# Patient Record
Sex: FEMALE | Race: WHITE
[De-identification: names, ages, dates, MRNs, and addresses within clinical notes are randomized per-mention and may not be internally consistent; named-entity substitution may affect disease eponyms.]

---

## 2023-03-28 ENCOUNTER — HOSPITAL ENCOUNTER (INPATIENT)
Dept: HOSPITAL 26 - MED | Age: 68
LOS: 3 days | Discharge: HOME HEALTH | DRG: 871 | End: 2023-03-31
Attending: STUDENT IN AN ORGANIZED HEALTH CARE EDUCATION/TRAINING PROGRAM | Admitting: STUDENT IN AN ORGANIZED HEALTH CARE EDUCATION/TRAINING PROGRAM
Payer: COMMERCIAL

## 2023-03-28 VITALS
DIASTOLIC BLOOD PRESSURE: 47 MMHG | SYSTOLIC BLOOD PRESSURE: 110 MMHG | WEIGHT: 212.19 LBS | BODY MASS INDEX: 40.06 KG/M2 | HEIGHT: 61 IN

## 2023-03-28 DIAGNOSIS — E11.65: ICD-10-CM

## 2023-03-28 DIAGNOSIS — D63.8: ICD-10-CM

## 2023-03-28 DIAGNOSIS — L30.4: ICD-10-CM

## 2023-03-28 DIAGNOSIS — I50.9: ICD-10-CM

## 2023-03-28 DIAGNOSIS — J18.9: ICD-10-CM

## 2023-03-28 DIAGNOSIS — N17.0: ICD-10-CM

## 2023-03-28 DIAGNOSIS — J44.0: ICD-10-CM

## 2023-03-28 DIAGNOSIS — J96.21: ICD-10-CM

## 2023-03-28 DIAGNOSIS — Z90.49: ICD-10-CM

## 2023-03-28 DIAGNOSIS — Z87.891: ICD-10-CM

## 2023-03-28 DIAGNOSIS — J44.1: ICD-10-CM

## 2023-03-28 DIAGNOSIS — A41.9: Primary | ICD-10-CM

## 2023-03-28 DIAGNOSIS — D72.0: ICD-10-CM

## 2023-03-28 DIAGNOSIS — I11.0: ICD-10-CM

## 2023-03-28 DIAGNOSIS — Z20.822: ICD-10-CM

## 2023-03-28 DIAGNOSIS — E78.5: ICD-10-CM

## 2023-03-28 LAB
ALBUMIN FLD-MCNC: 2.6 G/DL (ref 3.4–5)
ANION GAP SERPL CALCULATED.3IONS-SCNC: 15.3 MMOL/L (ref 8–16)
AST SERPL-CCNC: 21 U/L (ref 15–37)
BASOPHILS # BLD AUTO: 0.1 K/UL (ref 0–0.22)
BASOPHILS NFR BLD AUTO: 0.6 % (ref 0–2)
BILIRUB SERPL-MCNC: 0.3 MG/DL (ref 0–1)
BUN SERPL-MCNC: 40 MG/DL (ref 7–18)
CHLORIDE SERPL-SCNC: 93 MMOL/L (ref 98–107)
CO2 SERPL-SCNC: 28.5 MMOL/L (ref 21–32)
CREAT SERPL-MCNC: 1.7 MG/DL (ref 0.6–1.3)
EOSINOPHIL # BLD AUTO: 0 K/UL (ref 0–0.4)
EOSINOPHIL NFR BLD AUTO: 0 % (ref 0–4)
ERYTHROCYTE [DISTWIDTH] IN BLOOD BY AUTOMATED COUNT: 16.6 % (ref 11.6–13.7)
GFR SERPL CREATININE-BSD FRML MDRD: 39 ML/MIN (ref 90–?)
GLUCOSE SERPL-MCNC: 407 MG/DL (ref 74–106)
HCT VFR BLD AUTO: 34.5 % (ref 36–48)
HGB BLD-MCNC: 11 G/DL (ref 12–16)
LYMPHOCYTES # BLD AUTO: 0.4 K/UL (ref 2.5–16.5)
LYMPHOCYTES NFR BLD AUTO: 2.6 % (ref 20.5–51.1)
MCH RBC QN AUTO: 27 PG (ref 27–31)
MCHC RBC AUTO-ENTMCNC: 32 G/DL (ref 33–37)
MCV RBC AUTO: 84.1 FL (ref 80–94)
MONOCYTES # BLD AUTO: 0.2 K/UL (ref 0.8–1)
MONOCYTES NFR BLD AUTO: 1.1 % (ref 1.7–9.3)
NEUTROPHILS # BLD AUTO: 13.6 K/UL (ref 1.8–7.7)
NEUTROPHILS NFR BLD AUTO: 95.7 % (ref 42.2–75.2)
PLATELET # BLD AUTO: 324 K/UL (ref 140–450)
POTASSIUM SERPL-SCNC: 4.8 MMOL/L (ref 3.5–5.1)
RBC # BLD AUTO: 4.1 MIL/UL (ref 4.2–5.4)
SODIUM SERPL-SCNC: 132 MMOL/L (ref 136–145)
WBC # BLD AUTO: 14.3 K/UL (ref 4.8–10.8)

## 2023-03-28 PROCEDURE — C9113 INJ PANTOPRAZOLE SODIUM, VIA: HCPCS

## 2023-03-28 NOTE — NUR
brought in from [facility] with c/o SOB, according to pt, [facility] lost power  for a bit 
and she is normally on 3L of O2 NC. pmhx: HTN, DM, COPD, and CHF. denies any 
allergies.

## 2023-03-29 VITALS — SYSTOLIC BLOOD PRESSURE: 155 MMHG | DIASTOLIC BLOOD PRESSURE: 72 MMHG

## 2023-03-29 VITALS — DIASTOLIC BLOOD PRESSURE: 72 MMHG | SYSTOLIC BLOOD PRESSURE: 133 MMHG

## 2023-03-29 VITALS — DIASTOLIC BLOOD PRESSURE: 85 MMHG | SYSTOLIC BLOOD PRESSURE: 138 MMHG

## 2023-03-29 VITALS — DIASTOLIC BLOOD PRESSURE: 85 MMHG | SYSTOLIC BLOOD PRESSURE: 153 MMHG

## 2023-03-29 VITALS — DIASTOLIC BLOOD PRESSURE: 79 MMHG | SYSTOLIC BLOOD PRESSURE: 135 MMHG

## 2023-03-29 VITALS — SYSTOLIC BLOOD PRESSURE: 152 MMHG | DIASTOLIC BLOOD PRESSURE: 86 MMHG

## 2023-03-29 LAB
AMYLASE SERPL-CCNC: 29 U/L (ref 25–115)
ANION GAP SERPL CALCULATED.3IONS-SCNC: 10.4 MMOL/L (ref 8–16)
APPEARANCE UR: CLEAR
BARBITURATES UR QL SCN: NEGATIVE NG/ML
BASOPHILS # BLD AUTO: 0.1 K/UL (ref 0–0.22)
BASOPHILS NFR BLD AUTO: 0.9 % (ref 0–2)
BENZODIAZ UR QL SCN: NEGATIVE NG/ML
BILIRUB UR QL STRIP: NEGATIVE
BUN SERPL-MCNC: 28 MG/DL (ref 7–18)
BZE UR QL SCN: NEGATIVE NG/ML
CANNABINOIDS UR QL SCN: NEGATIVE NG/ML
CHLORIDE SERPL-SCNC: 94 MMOL/L (ref 98–107)
CHOLEST/HDLC SERPL: 2.7 {RATIO} (ref 1–4.5)
CO2 SERPL-SCNC: 35.3 MMOL/L (ref 21–32)
COLOR UR: YELLOW
CREAT SERPL-MCNC: 1.3 MG/DL (ref 0.6–1.3)
EOSINOPHIL # BLD AUTO: 0 K/UL (ref 0–0.4)
EOSINOPHIL NFR BLD AUTO: 0 % (ref 0–4)
ERYTHROCYTE [DISTWIDTH] IN BLOOD BY AUTOMATED COUNT: 16.3 % (ref 11.6–13.7)
GFR SERPL CREATININE-BSD FRML MDRD: 53 ML/MIN (ref 90–?)
GLUCOSE SERPL-MCNC: 314 MG/DL (ref 74–106)
GLUCOSE UR STRIP-MCNC: (no result) MG/DL
HCT VFR BLD AUTO: 33.3 % (ref 36–48)
HDLC SERPL-MCNC: 62 MG/DL (ref 40–60)
HGB BLD-MCNC: 10.5 G/DL (ref 12–16)
HGB UR QL STRIP: NEGATIVE
LDLC SERPL CALC-MCNC: 84 MG/DL (ref 60–100)
LEUKOCYTE ESTERASE UR QL STRIP: NEGATIVE
LIPASE SERPL-CCNC: 77 U/L (ref 73–393)
LYMPHOCYTES # BLD AUTO: 0.7 K/UL (ref 2.5–16.5)
LYMPHOCYTES NFR BLD AUTO: 5.6 % (ref 20.5–51.1)
MAGNESIUM SERPL-MCNC: 2.1 MG/DL (ref 1.8–2.4)
MCH RBC QN AUTO: 27 PG (ref 27–31)
MCHC RBC AUTO-ENTMCNC: 32 G/DL (ref 33–37)
MCV RBC AUTO: 84.4 FL (ref 80–94)
MONOCYTES # BLD AUTO: 0.6 K/UL (ref 0.8–1)
MONOCYTES NFR BLD AUTO: 4.9 % (ref 1.7–9.3)
NEUTROPHILS # BLD AUTO: 10.7 K/UL (ref 1.8–7.7)
NEUTROPHILS NFR BLD AUTO: 88.6 % (ref 42.2–75.2)
NITRITE UR QL STRIP: NEGATIVE
OPIATES UR QL SCN: NEGATIVE NG/ML
PCP UR QL SCN: NEGATIVE NG/ML
PH UR STRIP: 5.5 [PH] (ref 5–9)
PHOSPHATE SERPL-MCNC: 5 MG/DL (ref 2.5–4.9)
PLATELET # BLD AUTO: 341 K/UL (ref 140–450)
POTASSIUM SERPL-SCNC: 3.7 MMOL/L (ref 3.5–5.1)
PROTHROMBIN TIME: 9.5 SECS (ref 10.8–13.4)
RBC # BLD AUTO: 3.95 MIL/UL (ref 4.2–5.4)
SODIUM SERPL-SCNC: 136 MMOL/L (ref 136–145)
T4 FREE SERPL-MCNC: 0.86 NG/DL (ref 0.76–1.46)
TRIGL SERPL-MCNC: 102 MG/DL (ref 30–150)
TSH SERPL DL<=0.05 MIU/L-ACNC: 0.77 UIU/ML (ref 0.34–3.74)
WBC # BLD AUTO: 12.1 K/UL (ref 4.8–10.8)

## 2023-03-29 RX ADMIN — DOCUSATE SODIUM SCH MG: 100 CAPSULE, LIQUID FILLED ORAL at 12:27

## 2023-03-29 RX ADMIN — OXYCODONE AND ACETAMINOPHEN SCH TAB: 5; 325 TABLET ORAL at 12:10

## 2023-03-29 RX ADMIN — DEXTROSE SCH MLS/HR: 50 INJECTION, SOLUTION INTRAVENOUS at 12:12

## 2023-03-29 RX ADMIN — DOCUSATE SODIUM SCH MG: 100 CAPSULE, LIQUID FILLED ORAL at 20:43

## 2023-03-29 RX ADMIN — Medication SCH DEV: at 16:51

## 2023-03-29 RX ADMIN — PANTOPRAZOLE SODIUM SCH MG: 40 INJECTION, POWDER, FOR SOLUTION INTRAVENOUS at 12:26

## 2023-03-29 RX ADMIN — Medication SCH DEV: at 06:33

## 2023-03-29 RX ADMIN — Medication SCH DEV: at 12:14

## 2023-03-29 RX ADMIN — OXYCODONE AND ACETAMINOPHEN SCH TAB: 5; 325 TABLET ORAL at 20:26

## 2023-03-29 RX ADMIN — FUROSEMIDE SCH MG: 10 INJECTION, SOLUTION INTRAMUSCULAR; INTRAVENOUS at 08:08

## 2023-03-29 RX ADMIN — INSULIN LISPRO PRN UNITS: 100 INJECTION, SOLUTION INTRAVENOUS; SUBCUTANEOUS at 12:14

## 2023-03-29 RX ADMIN — WHITE PETROLATUM SCH EA: 57 PASTE TOPICAL at 16:50

## 2023-03-29 RX ADMIN — Medication SCH DEV: at 20:43

## 2023-03-29 RX ADMIN — INSULIN LISPRO PRN UNITS: 100 INJECTION, SOLUTION INTRAVENOUS; SUBCUTANEOUS at 16:52

## 2023-03-29 RX ADMIN — INSULIN LISPRO PRN UNITS: 100 INJECTION, SOLUTION INTRAVENOUS; SUBCUTANEOUS at 06:33

## 2023-03-29 NOTE — NUR
AM CARE RENDERED. ALL NEEDS ATTENDED TO. NO DISTRESS NOTED. SAFETY PRECAUTIONS MAINTAINED 
DURING THE SHIFT. CALL LIGHT REMAINS WITHIN REACH.

## 2023-03-29 NOTE — NUR
PATIENT HAS BEEN SCREENED AND CATEGORIZED AS MODERATE NUTRITION RISK. PATIENT WILL BE SEEN 
WITHIN 3-5 DAYS OF ADMISSION.



3/31/234/2/23



REVIEWED BY KIRILL OCSTA RD

## 2023-03-29 NOTE — NUR
RECEIVED PT AS A NEW ADMIT FROM ER. PATIENT IS AWAKE, ALERT AND ORIENTED X 4. DENIES PAIN AT 
THIS TIME. DENIES SHORTNESS OF BREATH. SKIN WARM AND DRY TO TOUCH. SAFETY PRECAUTIONS IN 
PLACE, CALL LIGHT IN REACH, INSTRUCTION ON USE PROVIDED, ENCOURAGED TO CALL IF ASSISTANCE IS 
NEEDED.

## 2023-03-29 NOTE — NUR
HAND-OFF REPORT RECEIVED FROM REN RUST. PLAN OF CARE TO CONT. HEP GTT, PUMO AND NEURO 
CONSULT ORDERED. REPEAT CXR AND PERCOCET FOR PAIN. RECEIVED PT A/0 X4 DX CHF. NO S/S SOB

## 2023-03-29 NOTE — NUR
WOUND CARE NOTE:

PT. ADMITTED WITH INTERTRIGO TO UNDER BREAST FOLDS, ABDOMINAL FOLDS SKIN MOIST, RED AND 
PEELING. PT. ALSO ADMITTED WITH MODERATE MASD TO GROINS, PERINEUM, NORMA-ANAL AND BUTTOCKS, 
SKIN MOIST, RED WITH RASHES. PT WITH LOW BERTIN SCALE AT HI RISK WILL CONTINUE PRESSURE 
INJURY PREVENTION MEASUREMENTS.

RECOMMENDATIONS:

-INTERTRIGO UNDER BREAST FOLDS, ABDOMINAL FOLDS CLEANSE WITH SOAP AND WATER, PAT DRY, DUST 
WITH NYSTATIN POWDER BID AND PRN IF SOILING

- MOISTURE ASSOCIATED DERMATITIS TO GROINS, PERINEUM, NORMA-ANAL AND BUTTOCKS: CLEANSE WITH 
SOAP AND WATER, PAT DRY, APPLY Z GUARD BID AND PRN IF SOILED. 

-PRESSURE REDISTRIBUTION SURFACE THERAPY OLGA ISOFLEX MATTRESS

-POSITIONING:

TURN AND REPOSITION PATIENT Q 2H OR SOONER

USE PILLOWS TO KEEP BONY PROMINENCES FROM DIRECT CONTACT WITH SURFACES

USE REPOSITIONING WEDGES TO PROVIDE 30-DEGREE ANGLE FOR SIDE LYING POSITIONS

OFFLOADING OR FOAM DRESSING TO ALL TUBING TO PREVENT MEDICAL DEVICES RELATED PRESSURE INJURY

-RE-EVALUATING AND MANAGING INCONTINENCE

MONITOR SKIN CONDITION DURING POSITION CHANGE

DO NOT MASSAGE REDNESS, BONY PROMINENCES

FREQUENT NORMA-CARE AND PROVIDE BARRIER CREAMS PRN IF SOILING

MOISTURE CONTROL BY OFFER BED PAN/URINAL /ABSORBENT PAD TO WICK AND HOLD MOISTURE

KEEP SKIN DRY AND PROTECT FROM FRICTION

-MANAGE FRICTION/SHEAR/MOBILITY

KEEP HOB AT THE LOWEST LEVEL OF ELEVATION NO MORE THAN 30 DEGREE UNLESS OTHERWISE 
CONTRAINDICATED

PROTECT HEELS, ELBOWS BONY PROMINENCES WITH SKIN BERRIES OR FOAM DRESSING IF EXPOSED TO 
FRICTION

OFFLOAD BILATERAL HEELS BY PLACING PILLOWS UNDER CALVES AT ALL TIMES, UNLESS OTHERWISE 
CONTRAINDICATED

-NUTRITION:

PLEASE FOLLOW RD RECOMMENDATIONS AND OFFER NUTRITION SUPPLEMENTS IF ORDERED.


-------------------------------------------------------------------------------

Addendum: 03/29/23 at 1447 by FRANCES Hardin RN (Grace)

-------------------------------------------------------------------------------

RIGHT HIP INTACT BLISTER CLEAR FLUIDS DRESSING DCI. NORMA WOUND SKIN INTACT.

## 2023-03-29 NOTE — NUR
receive the patient from the night shift rn in rm 120B aox4 with admitting diagnosis 
congestive heart failure . will continue to monitor

## 2023-03-30 VITALS — DIASTOLIC BLOOD PRESSURE: 78 MMHG | SYSTOLIC BLOOD PRESSURE: 144 MMHG

## 2023-03-30 VITALS — DIASTOLIC BLOOD PRESSURE: 82 MMHG | SYSTOLIC BLOOD PRESSURE: 159 MMHG

## 2023-03-30 VITALS — DIASTOLIC BLOOD PRESSURE: 87 MMHG | SYSTOLIC BLOOD PRESSURE: 133 MMHG

## 2023-03-30 VITALS — SYSTOLIC BLOOD PRESSURE: 151 MMHG | DIASTOLIC BLOOD PRESSURE: 94 MMHG

## 2023-03-30 VITALS — SYSTOLIC BLOOD PRESSURE: 159 MMHG | DIASTOLIC BLOOD PRESSURE: 82 MMHG

## 2023-03-30 VITALS — SYSTOLIC BLOOD PRESSURE: 150 MMHG | DIASTOLIC BLOOD PRESSURE: 97 MMHG

## 2023-03-30 LAB
ANION GAP SERPL CALCULATED.3IONS-SCNC: 10 MMOL/L (ref 8–16)
BASOPHILS # BLD AUTO: 0.1 K/UL (ref 0–0.22)
BASOPHILS NFR BLD AUTO: 0.6 % (ref 0–2)
BUN SERPL-MCNC: 18 MG/DL (ref 7–18)
CHLORIDE SERPL-SCNC: 98 MMOL/L (ref 98–107)
CO2 SERPL-SCNC: 35.3 MMOL/L (ref 21–32)
CREAT SERPL-MCNC: 0.9 MG/DL (ref 0.6–1.3)
EOSINOPHIL # BLD AUTO: 0.2 K/UL (ref 0–0.4)
EOSINOPHIL NFR BLD AUTO: 1.3 % (ref 0–4)
ERYTHROCYTE [DISTWIDTH] IN BLOOD BY AUTOMATED COUNT: 16.4 % (ref 11.6–13.7)
GFR SERPL CREATININE-BSD FRML MDRD: 80 ML/MIN (ref 90–?)
GLUCOSE SERPL-MCNC: 181 MG/DL (ref 74–106)
HCT VFR BLD AUTO: 35.3 % (ref 36–48)
HGB BLD-MCNC: 11 G/DL (ref 12–16)
LYMPHOCYTES # BLD AUTO: 1.7 K/UL (ref 2.5–16.5)
LYMPHOCYTES NFR BLD AUTO: 13 % (ref 20.5–51.1)
MAGNESIUM SERPL-MCNC: 1.8 MG/DL (ref 1.8–2.4)
MCH RBC QN AUTO: 27 PG (ref 27–31)
MCHC RBC AUTO-ENTMCNC: 31 G/DL (ref 33–37)
MCV RBC AUTO: 84.8 FL (ref 80–94)
MONOCYTES # BLD AUTO: 1.2 K/UL (ref 0.8–1)
MONOCYTES NFR BLD AUTO: 9.6 % (ref 1.7–9.3)
NEUTROPHILS # BLD AUTO: 9.8 K/UL (ref 1.8–7.7)
NEUTROPHILS NFR BLD AUTO: 75.5 % (ref 42.2–75.2)
PHOSPHATE SERPL-MCNC: 1.8 MG/DL (ref 2.5–4.9)
PLATELET # BLD AUTO: 376 K/UL (ref 140–450)
POTASSIUM SERPL-SCNC: 4.3 MMOL/L (ref 3.5–5.1)
RBC # BLD AUTO: 4.16 MIL/UL (ref 4.2–5.4)
SODIUM SERPL-SCNC: 139 MMOL/L (ref 136–145)
WBC # BLD AUTO: 12.9 K/UL (ref 4.8–10.8)

## 2023-03-30 RX ADMIN — PANTOPRAZOLE SODIUM SCH MG: 40 INJECTION, POWDER, FOR SOLUTION INTRAVENOUS at 08:40

## 2023-03-30 RX ADMIN — DOCUSATE SODIUM SCH MG: 100 CAPSULE, LIQUID FILLED ORAL at 20:29

## 2023-03-30 RX ADMIN — Medication SCH DEV: at 20:40

## 2023-03-30 RX ADMIN — INSULIN LISPRO PRN UNITS: 100 INJECTION, SOLUTION INTRAVENOUS; SUBCUTANEOUS at 20:41

## 2023-03-30 RX ADMIN — NYSTATIN SCH GM: 100000 POWDER TOPICAL at 01:25

## 2023-03-30 RX ADMIN — INSULIN LISPRO PRN UNITS: 100 INJECTION, SOLUTION INTRAVENOUS; SUBCUTANEOUS at 06:44

## 2023-03-30 RX ADMIN — Medication SCH DEV: at 11:29

## 2023-03-30 RX ADMIN — OXYCODONE AND ACETAMINOPHEN SCH TAB: 5; 325 TABLET ORAL at 20:29

## 2023-03-30 RX ADMIN — WHITE PETROLATUM SCH EA: 57 PASTE TOPICAL at 01:25

## 2023-03-30 RX ADMIN — NYSTATIN SCH GM: 100000 POWDER TOPICAL at 12:08

## 2023-03-30 RX ADMIN — Medication SCH DEV: at 16:05

## 2023-03-30 RX ADMIN — DEXTROSE SCH MLS/HR: 50 INJECTION, SOLUTION INTRAVENOUS at 08:55

## 2023-03-30 RX ADMIN — DOCUSATE SODIUM SCH MG: 100 CAPSULE, LIQUID FILLED ORAL at 08:41

## 2023-03-30 RX ADMIN — WHITE PETROLATUM SCH EA: 57 PASTE TOPICAL at 12:08

## 2023-03-30 RX ADMIN — FUROSEMIDE SCH MG: 10 INJECTION, SOLUTION INTRAMUSCULAR; INTRAVENOUS at 08:41

## 2023-03-30 RX ADMIN — OXYCODONE AND ACETAMINOPHEN SCH TAB: 5; 325 TABLET ORAL at 12:05

## 2023-03-30 RX ADMIN — OXYCODONE AND ACETAMINOPHEN SCH TAB: 5; 325 TABLET ORAL at 04:44

## 2023-03-30 RX ADMIN — INSULIN LISPRO PRN UNITS: 100 INJECTION, SOLUTION INTRAVENOUS; SUBCUTANEOUS at 15:54

## 2023-03-30 RX ADMIN — Medication SCH DEV: at 06:53

## 2023-03-30 RX ADMIN — INSULIN LISPRO PRN UNITS: 100 INJECTION, SOLUTION INTRAVENOUS; SUBCUTANEOUS at 11:29

## 2023-03-30 NOTE — NUR
ALL SCHEDULED AND PRESCRIBED MEDICATION WAS GIVEN TO PT AS PER MD ORDER. ALL SAFETY MEASURES 
IMPLEMENTED. BED IN LOW POSITION, BED WHEELS ON LOCK AND CALL LIGHT WITHIN REACH.

## 2023-03-30 NOTE — NUR
HUGE BM. SOFT AND HEAVY BED PAIN "FULL" STOPPED UP TOILET.PT STATED FELT "GREAT NOW". BATHED 
AND ALL POWDERS AND OINTMENT APPLIED. LINENS CHANGED. RESTING WELL

## 2023-03-30 NOTE — NUR
CALLED PATIENT DAUGHTER ALVIN TO ASK IF PATIENT HAS EVER HAD AND HOME HEALTH FOR PT 
BEFORE AND SHE TOLD ME YES SHE ALSO INFORMED ME OF PATIENTS CURRENT ADDRESS WHICH IS [address] [place] [address] [place] [address] PATIENT LIVES ALONE AT A GROUP HOME WITH 6 OTHER RESIDENTS 
ALL RAN BY GERARD [phone]. CALLED GERARD TO ASK ABOUT HOME HEALTH AND SHE INFORMED 
ME THAT THE PATIENTS DOCTOR HAD ALREADY ORDERED HOME HEALTH FOR PHYSICAL THERAPY AND IS 
SUPPOSED TO HAVE HER FIRST SESSION 3/30/2023 AT 1300. NO NEED TO STAT A NEW ORDER FOR HOME 
HEALTH PT.

-------------------------------------------------------------------------------

Addendum: 23 at 1437 by Kirti Luong RN

-------------------------------------------------------------------------------

DC PLANNIN YRS OLD FEMALE PATIENT WAS ADMITTED FROM HOME WITH A DX OF CHF EXACERBATION. PATIENT HAS 
A HX OF DM, CHF, HTN, AND COPD WITH HOME O2. CXR SHOWED MILD BIBASILAR OPACITIES. RAPID 
COVID TEST NEGATIVE. ADMINISTERED IV ABX ROCEPHIN AND AZITHROMYCIN AND LASIX IV . CONSULTED 
WITH PULMO AND NEPHROLOGIST . DC PLAN TO RETURN HOME WHEN STABLE CM TO FOLLOW 

-------------------------------------------------------------------------------

Addendum: 23 at 1201 by Kirti Luong RN

-------------------------------------------------------------------------------

DC PLANNING:

PATIENT OLIVEIRA A DC ORDER PER HANNAH AT ROOM AND BOARD NOT PROVIDE TRANSPORT. DC PLANNER TO 
ARRANGE TRANSPORT WITH [facility] MEDICAL CM TO FOLLOW

## 2023-03-30 NOTE — NUR
ANOTHER HUGE BED WATSON FUL OF SOFT BM. CLEANSED AND REASSRED BETTER FOR IT TO COME OUT THAN 
STAY IN. NON-SMELLING AND NOT WATERY.

## 2023-03-30 NOTE — NUR
PHYSICAL THERAPY COMPLETED AT BEDSIDE. PT TOLERATED WELL. PT ASSISTED ON BEDPAN. HYGIENIC 
CARE PROVIDED. NEEDS ALL MET AT THIS TIME. ALL SAFETY MEASURES IN PLACE.

## 2023-03-30 NOTE — NUR
REPORT TO MARISA, DAY SHIFT NURSE. RELINGQUISHED CARE OF PT. SUPER NIGHT. STATED VERY HAPPY 
WITH NURSING CARE AT THIS HOSPITAL.

## 2023-03-30 NOTE — NUR
RECEIVED REPORT FROM NIGHTSHIFT NURSE, ADELINE. PT A/O X4. HOB ELEVATED. ON 3L NC. CARDIAC 
DIET. PUREWICK IN PLACE. LFA #24 SL. DENIES PAIN. ALL NEEDS MET AT THIS TIME. ALL SAFETY 
MEASURES IN PLACE.

## 2023-03-30 NOTE — NUR
RECEIVED PT FROM MORNING SHIFT NURSE. PT IS AOX4, BEDREST, ABLE TO VERBALIZE NEEDS AND ABLE 
TO FOLLOW COMMAND. PT IS ON 3L NC AND ON CARDIAC DIET. PT HAS IV ON RIGHT FOREARM AC GAUGE 
24, RUNNING WITH NS AT TKO. PT HAS REDNESS ON BILATERAL BREAST FOLDS OF BREAST AND HAS RIGHT 
HIP BLISTER. NO COMPLAIN OF PAIN AT THIS TIME. NO S/S OF RESPIRATORY DISTRESS NOTED. ALL 
SAFETY MEASURES IMPLEMENTED. BED IN LOW POSITION, BED WHEELS ON LOCK AND CALL LIGHT WITHIN 
REACH.

## 2023-03-31 VITALS — DIASTOLIC BLOOD PRESSURE: 64 MMHG | SYSTOLIC BLOOD PRESSURE: 133 MMHG

## 2023-03-31 VITALS — SYSTOLIC BLOOD PRESSURE: 160 MMHG | DIASTOLIC BLOOD PRESSURE: 100 MMHG

## 2023-03-31 VITALS — SYSTOLIC BLOOD PRESSURE: 139 MMHG | DIASTOLIC BLOOD PRESSURE: 79 MMHG

## 2023-03-31 VITALS — SYSTOLIC BLOOD PRESSURE: 155 MMHG | DIASTOLIC BLOOD PRESSURE: 92 MMHG

## 2023-03-31 VITALS — SYSTOLIC BLOOD PRESSURE: 157 MMHG | DIASTOLIC BLOOD PRESSURE: 95 MMHG

## 2023-03-31 VITALS — SYSTOLIC BLOOD PRESSURE: 155 MMHG | DIASTOLIC BLOOD PRESSURE: 82 MMHG

## 2023-03-31 LAB
ANION GAP SERPL CALCULATED.3IONS-SCNC: 11.7 MMOL/L (ref 8–16)
BASOPHILS # BLD AUTO: 0.1 K/UL (ref 0–0.22)
BASOPHILS NFR BLD AUTO: 1.2 % (ref 0–2)
BUN SERPL-MCNC: 13 MG/DL (ref 7–18)
CHLORIDE SERPL-SCNC: 98 MMOL/L (ref 98–107)
CO2 SERPL-SCNC: 31.8 MMOL/L (ref 21–32)
CREAT SERPL-MCNC: 0.7 MG/DL (ref 0.6–1.3)
EOSINOPHIL # BLD AUTO: 0.2 K/UL (ref 0–0.4)
EOSINOPHIL NFR BLD AUTO: 2.3 % (ref 0–4)
ERYTHROCYTE [DISTWIDTH] IN BLOOD BY AUTOMATED COUNT: 16.6 % (ref 11.6–13.7)
GFR SERPL CREATININE-BSD FRML MDRD: 107 ML/MIN (ref 90–?)
GLUCOSE SERPL-MCNC: 210 MG/DL (ref 74–106)
HCT VFR BLD AUTO: 38.3 % (ref 36–48)
HGB BLD-MCNC: 12.4 G/DL (ref 12–16)
LYMPHOCYTES # BLD AUTO: 1.2 K/UL (ref 2.5–16.5)
LYMPHOCYTES NFR BLD AUTO: 12.7 % (ref 20.5–51.1)
MAGNESIUM SERPL-MCNC: 1.8 MG/DL (ref 1.8–2.4)
MCH RBC QN AUTO: 27 PG (ref 27–31)
MCHC RBC AUTO-ENTMCNC: 32 G/DL (ref 33–37)
MCV RBC AUTO: 84.2 FL (ref 80–94)
MONOCYTES # BLD AUTO: 1 K/UL (ref 0.8–1)
MONOCYTES NFR BLD AUTO: 11.2 % (ref 1.7–9.3)
NEUTROPHILS # BLD AUTO: 6.8 K/UL (ref 1.8–7.7)
NEUTROPHILS NFR BLD AUTO: 72.6 % (ref 42.2–75.2)
PHOSPHATE SERPL-MCNC: 3 MG/DL (ref 2.5–4.9)
PLATELET # BLD AUTO: 323 K/UL (ref 140–450)
POTASSIUM SERPL-SCNC: 4.5 MMOL/L (ref 3.5–5.1)
RBC # BLD AUTO: 4.54 MIL/UL (ref 4.2–5.4)
SODIUM SERPL-SCNC: 137 MMOL/L (ref 136–145)
WBC # BLD AUTO: 9.4 K/UL (ref 4.8–10.8)

## 2023-03-31 RX ADMIN — Medication SCH DEV: at 06:36

## 2023-03-31 RX ADMIN — DEXTROSE SCH MLS/HR: 50 INJECTION, SOLUTION INTRAVENOUS at 10:46

## 2023-03-31 RX ADMIN — Medication SCH DEV: at 12:27

## 2023-03-31 RX ADMIN — OXYCODONE AND ACETAMINOPHEN SCH TAB: 5; 325 TABLET ORAL at 04:02

## 2023-03-31 RX ADMIN — OXYCODONE AND ACETAMINOPHEN SCH TAB: 5; 325 TABLET ORAL at 12:08

## 2023-03-31 RX ADMIN — PANTOPRAZOLE SODIUM SCH MG: 40 INJECTION, POWDER, FOR SOLUTION INTRAVENOUS at 10:45

## 2023-03-31 RX ADMIN — NYSTATIN SCH GM: 100000 POWDER TOPICAL at 01:08

## 2023-03-31 RX ADMIN — NYSTATIN SCH GM: 100000 POWDER TOPICAL at 13:30

## 2023-03-31 RX ADMIN — WHITE PETROLATUM SCH EA: 57 PASTE TOPICAL at 01:08

## 2023-03-31 RX ADMIN — INSULIN LISPRO PRN UNITS: 100 INJECTION, SOLUTION INTRAVENOUS; SUBCUTANEOUS at 17:52

## 2023-03-31 RX ADMIN — INSULIN LISPRO PRN UNITS: 100 INJECTION, SOLUTION INTRAVENOUS; SUBCUTANEOUS at 12:23

## 2023-03-31 RX ADMIN — WHITE PETROLATUM SCH EA: 57 PASTE TOPICAL at 13:30

## 2023-03-31 RX ADMIN — FUROSEMIDE SCH MG: 10 INJECTION, SOLUTION INTRAMUSCULAR; INTRAVENOUS at 10:42

## 2023-03-31 RX ADMIN — INSULIN LISPRO PRN UNITS: 100 INJECTION, SOLUTION INTRAVENOUS; SUBCUTANEOUS at 06:37

## 2023-03-31 RX ADMIN — DOCUSATE SODIUM SCH MG: 100 CAPSULE, LIQUID FILLED ORAL at 10:38

## 2023-03-31 RX ADMIN — Medication SCH DEV: at 17:51

## 2023-03-31 NOTE — NUR
PT IS ON SLEEP. CHEST RISE AND SYMMETRICALLY NOTED. RESPIRATION IS EVEN AND UNLABORED. ALL 
SAFETY MEASURES IMPLEMENTED. BED IN LOW POSITION, BED WHEELS ON LOCK AND CALL LIGHT WITHIN 
REACH.

## 2023-03-31 NOTE — NUR
SET UP TRANSPORTATION WITH DEJUAN AT [facility] [phone] FOR A GURNEY TRANSPORTATION 
WITH 3L OF O2. WITH A 1530  TIME TO GO TO GROUP [place] LOCATED AT [address] [place] [address] [place] [address]. CONFIRMATION #[id number]. SPOKE WITH CARE GIVER GERARD [phone], 
DAUGHTER ALVIN [phone] AND INFORMED HER OF THE ABOVE INFORMATION AS WELL AS

## 2023-03-31 NOTE — NUR
CHECKED THE PT STILL ON SLEEP. CHEST RISE AND SYMMETRICALLY NOTED. RESPIRATION IS EVEN AND 
UNLABORED. ALL SAFETY MEASURES IMPLEMENTED. BED IN LOW POSITION, BED WHEELS ON LOCK AND CALL 
LIGHT WITHIN REACH.

## 2023-03-31 NOTE — NUR
MORNING CARE WAS DONE TO PT. CHANGED CHUCKS, GOWN, LINENS AND PUREWICK. ALL SAFETY MEASURES 
IMPLEMENTED. BED IN LOW POSITION, BED WHEELS ON LOCK AND CALL LIGHT WITHIN REACH.

## 2023-03-31 NOTE — NUR
3/31/23 RD INITIAL ASSESSMENT COMPLETED



PLEASE REFER TO NUTRITION ASSESSMENT UNDER CARE ACTIVITY FOR ESTIMATED NUTRITIONAL NEEDS. 



1. CONTINUE CARDIAC, [id number] DIET AS TOLERATED 

2. RECOMMEND GLUCERNA BID

- PROVIDES 440 KCAL AND 20 GM PROTEIN DAILY

3. PROVIDED NUTRITION EDUCATION WITH HANDOUTS FOR DM AND CHF

4. RD TO FOLLOW-UP 7 DAYS, LOW RISK 



REVIEWED BY KIRILL COSTA RD

## 2023-06-12 ENCOUNTER — HOSPITAL ENCOUNTER (INPATIENT)
Dept: HOSPITAL 26 - MED | Age: 68
LOS: 3 days | Discharge: HOME HEALTH | DRG: 871 | End: 2023-06-15
Attending: STUDENT IN AN ORGANIZED HEALTH CARE EDUCATION/TRAINING PROGRAM | Admitting: STUDENT IN AN ORGANIZED HEALTH CARE EDUCATION/TRAINING PROGRAM
Payer: COMMERCIAL

## 2023-06-12 VITALS — DIASTOLIC BLOOD PRESSURE: 79 MMHG | SYSTOLIC BLOOD PRESSURE: 145 MMHG

## 2023-06-12 VITALS — SYSTOLIC BLOOD PRESSURE: 162 MMHG | DIASTOLIC BLOOD PRESSURE: 90 MMHG

## 2023-06-12 VITALS — BODY MASS INDEX: 34.78 KG/M2 | WEIGHT: 189 LBS | HEIGHT: 62 IN

## 2023-06-12 VITALS — SYSTOLIC BLOOD PRESSURE: 152 MMHG | DIASTOLIC BLOOD PRESSURE: 87 MMHG

## 2023-06-12 DIAGNOSIS — M87.9: ICD-10-CM

## 2023-06-12 DIAGNOSIS — J90: ICD-10-CM

## 2023-06-12 DIAGNOSIS — Z20.822: ICD-10-CM

## 2023-06-12 DIAGNOSIS — I11.0: ICD-10-CM

## 2023-06-12 DIAGNOSIS — J69.0: ICD-10-CM

## 2023-06-12 DIAGNOSIS — M16.51: ICD-10-CM

## 2023-06-12 DIAGNOSIS — A41.9: Primary | ICD-10-CM

## 2023-06-12 DIAGNOSIS — E11.9: ICD-10-CM

## 2023-06-12 DIAGNOSIS — I50.43: ICD-10-CM

## 2023-06-12 DIAGNOSIS — Z90.49: ICD-10-CM

## 2023-06-12 DIAGNOSIS — E44.1: ICD-10-CM

## 2023-06-12 DIAGNOSIS — Z87.891: ICD-10-CM

## 2023-06-12 DIAGNOSIS — J44.9: ICD-10-CM

## 2023-06-12 DIAGNOSIS — J96.21: ICD-10-CM

## 2023-06-12 LAB
ALBUMIN FLD-MCNC: 3.2 G/DL (ref 3.4–5)
AMYLASE SERPL-CCNC: 37 U/L (ref 25–115)
ANION GAP SERPL CALCULATED.3IONS-SCNC: 10.2 MMOL/L (ref 8–16)
APPEARANCE UR: CLEAR
AST SERPL-CCNC: 17 U/L (ref 15–37)
BARBITURATES UR QL SCN: NEGATIVE NG/ML
BASOPHILS # BLD AUTO: 0.1 K/UL (ref 0–0.22)
BASOPHILS NFR BLD AUTO: 0.6 % (ref 0–2)
BENZODIAZ UR QL SCN: NEGATIVE NG/ML
BILIRUB SERPL-MCNC: 0.5 MG/DL (ref 0–1)
BILIRUB UR QL STRIP: NEGATIVE
BUN SERPL-MCNC: 9 MG/DL (ref 7–18)
BZE UR QL SCN: NEGATIVE NG/ML
CANNABINOIDS UR QL SCN: NEGATIVE NG/ML
CHLORIDE SERPL-SCNC: 106 MMOL/L (ref 98–107)
CHOLEST/HDLC SERPL: 3 {RATIO} (ref 1–4.5)
CO2 SERPL-SCNC: 28.8 MMOL/L (ref 21–32)
COLOR UR: YELLOW
CREAT SERPL-MCNC: 0.7 MG/DL (ref 0.6–1.3)
EOSINOPHIL # BLD AUTO: 0.2 K/UL (ref 0–0.4)
EOSINOPHIL NFR BLD AUTO: 1.9 % (ref 0–4)
ERYTHROCYTE [DISTWIDTH] IN BLOOD BY AUTOMATED COUNT: 15.9 % (ref 11.6–13.7)
GFR SERPL CREATININE-BSD FRML MDRD: 107 ML/MIN (ref 90–?)
GLUCOSE SERPL-MCNC: 126 MG/DL (ref 74–106)
GLUCOSE UR STRIP-MCNC: (no result) MG/DL
HCT VFR BLD AUTO: 36.5 % (ref 36–48)
HDLC SERPL-MCNC: 50 MG/DL (ref 40–60)
HGB BLD-MCNC: 11.6 G/DL (ref 12–16)
HGB UR QL STRIP: NEGATIVE
LDLC SERPL CALC-MCNC: 66 MG/DL (ref 60–100)
LEUKOCYTE ESTERASE UR QL STRIP: NEGATIVE
LIPASE SERPL-CCNC: 112 U/L (ref 73–393)
LYMPHOCYTES # BLD AUTO: 1 K/UL (ref 2.5–16.5)
LYMPHOCYTES NFR BLD AUTO: 12.7 % (ref 20.5–51.1)
MAGNESIUM SERPL-MCNC: 1.6 MG/DL (ref 1.8–2.4)
MCH RBC QN AUTO: 27 PG (ref 27–31)
MCHC RBC AUTO-ENTMCNC: 32 G/DL (ref 33–37)
MCV RBC AUTO: 84.6 FL (ref 80–94)
MONOCYTES # BLD AUTO: 0.5 K/UL (ref 0.8–1)
MONOCYTES NFR BLD AUTO: 6.4 % (ref 1.7–9.3)
NEUTROPHILS # BLD AUTO: 6.1 K/UL (ref 1.8–7.7)
NEUTROPHILS NFR BLD AUTO: 78.4 % (ref 42.2–75.2)
NITRITE UR QL STRIP: NEGATIVE
OPIATES UR QL SCN: NEGATIVE NG/ML
PCP UR QL SCN: NEGATIVE NG/ML
PH UR STRIP: 5.5 [PH] (ref 5–9)
PHOSPHATE SERPL-MCNC: 2.6 MG/DL (ref 2.5–4.9)
PLATELET # BLD AUTO: 304 K/UL (ref 140–450)
POTASSIUM SERPL-SCNC: 4 MMOL/L (ref 3.5–5.1)
PROTHROMBIN TIME: 10.1 SECS (ref 10.8–13.4)
RBC # BLD AUTO: 4.31 MIL/UL (ref 4.2–5.4)
SODIUM SERPL-SCNC: 141 MMOL/L (ref 136–145)
T4 FREE SERPL-MCNC: 0.95 NG/DL (ref 0.76–1.46)
TRIGL SERPL-MCNC: 167 MG/DL (ref 30–150)
TSH SERPL DL<=0.05 MIU/L-ACNC: 0.74 UIU/ML (ref 0.34–3.74)
WBC # BLD AUTO: 7.8 K/UL (ref 4.8–10.8)

## 2023-06-12 PROCEDURE — C9113 INJ PANTOPRAZOLE SODIUM, VIA: HCPCS

## 2023-06-12 RX ADMIN — MORPHINE SULFATE PRN MG: 2 INJECTION, SOLUTION INTRAMUSCULAR; INTRAVENOUS at 21:18

## 2023-06-12 RX ADMIN — INSULIN LISPRO PRN UNITS: 100 INJECTION, SOLUTION INTRAVENOUS; SUBCUTANEOUS at 17:02

## 2023-06-12 RX ADMIN — OXYCODONE AND ACETAMINOPHEN PRN TAB: 5; 325 TABLET ORAL at 15:55

## 2023-06-12 RX ADMIN — Medication SCH DEV: at 13:05

## 2023-06-12 RX ADMIN — OXYCODONE AND ACETAMINOPHEN PRN TAB: 5; 325 TABLET ORAL at 13:55

## 2023-06-12 RX ADMIN — DOCUSATE SODIUM SCH MG: 100 CAPSULE, LIQUID FILLED ORAL at 20:40

## 2023-06-12 RX ADMIN — Medication SCH DEV: at 20:28

## 2023-06-12 RX ADMIN — INSULIN LISPRO PRN UNITS: 100 INJECTION, SOLUTION INTRAVENOUS; SUBCUTANEOUS at 20:31

## 2023-06-12 RX ADMIN — WATER SCH MG: 1 INJECTION INTRAMUSCULAR; INTRAVENOUS; SUBCUTANEOUS at 20:39

## 2023-06-12 RX ADMIN — OXYCODONE AND ACETAMINOPHEN PRN TAB: 5; 325 TABLET ORAL at 23:40

## 2023-06-12 RX ADMIN — Medication SCH DEV: at 17:03

## 2023-06-12 RX ADMIN — MORPHINE SULFATE PRN MG: 2 INJECTION, SOLUTION INTRAMUSCULAR; INTRAVENOUS at 17:07

## 2023-06-12 RX ADMIN — WATER SCH MG: 1 INJECTION INTRAMUSCULAR; INTRAVENOUS; SUBCUTANEOUS at 13:00

## 2023-06-12 NOTE — NUR
Patient will be admitted to care of Dr Carvalho. Admited to telemetry.  Will go to 
room 108b. Belongings list completed.  Report to Wil rhodes.

## 2023-06-12 NOTE — NUR
PATIENT HAS BEEN SCREENED AND CATEGORIZED AS MODERATE NUTRITION RISK. PATIENT WILL BE SEEN 
WITHIN 3-5 DAYS OF ADMISSION.



6/15/23-6/17/23



MEJIA DESIR RD

## 2023-06-12 NOTE — NUR
PT GOT TO THE UNIT, REPORT RECEIVED , REORIENTED TO HOSPITAL ROOM. VS OBTAINED , ASSESSMENT 
DONE O22LNC ON.PT DENIES OF ANY PAIN AT THIS TIME.[id number]

## 2023-06-13 VITALS — DIASTOLIC BLOOD PRESSURE: 85 MMHG | SYSTOLIC BLOOD PRESSURE: 152 MMHG

## 2023-06-13 VITALS — DIASTOLIC BLOOD PRESSURE: 68 MMHG | SYSTOLIC BLOOD PRESSURE: 149 MMHG

## 2023-06-13 VITALS — DIASTOLIC BLOOD PRESSURE: 76 MMHG | SYSTOLIC BLOOD PRESSURE: 142 MMHG

## 2023-06-13 VITALS — DIASTOLIC BLOOD PRESSURE: 83 MMHG | SYSTOLIC BLOOD PRESSURE: 148 MMHG

## 2023-06-13 VITALS — SYSTOLIC BLOOD PRESSURE: 138 MMHG | DIASTOLIC BLOOD PRESSURE: 71 MMHG

## 2023-06-13 VITALS — SYSTOLIC BLOOD PRESSURE: 157 MMHG | DIASTOLIC BLOOD PRESSURE: 90 MMHG

## 2023-06-13 LAB
ANION GAP SERPL CALCULATED.3IONS-SCNC: 15 MMOL/L (ref 8–16)
BASOPHILS # BLD AUTO: 0 K/UL (ref 0–0.22)
BASOPHILS NFR BLD AUTO: 0.1 % (ref 0–2)
BUN SERPL-MCNC: 22 MG/DL (ref 7–18)
CHLORIDE SERPL-SCNC: 98 MMOL/L (ref 98–107)
CO2 SERPL-SCNC: 26.4 MMOL/L (ref 21–32)
CREAT SERPL-MCNC: 0.8 MG/DL (ref 0.6–1.3)
EOSINOPHIL # BLD AUTO: 0 K/UL (ref 0–0.4)
EOSINOPHIL NFR BLD AUTO: 0 % (ref 0–4)
ERYTHROCYTE [DISTWIDTH] IN BLOOD BY AUTOMATED COUNT: 15.6 % (ref 11.6–13.7)
GFR SERPL CREATININE-BSD FRML MDRD: 92 ML/MIN (ref 90–?)
GLUCOSE SERPL-MCNC: 245 MG/DL (ref 74–106)
HCT VFR BLD AUTO: 37.8 % (ref 36–48)
HGB BLD-MCNC: 12.3 G/DL (ref 12–16)
LYMPHOCYTES # BLD AUTO: 0.8 K/UL (ref 2.5–16.5)
LYMPHOCYTES NFR BLD AUTO: 8.7 % (ref 20.5–51.1)
MAGNESIUM SERPL-MCNC: 2.2 MG/DL (ref 1.8–2.4)
MCH RBC QN AUTO: 27 PG (ref 27–31)
MCHC RBC AUTO-ENTMCNC: 33 G/DL (ref 33–37)
MCV RBC AUTO: 83.1 FL (ref 80–94)
MONOCYTES # BLD AUTO: 0.2 K/UL (ref 0.8–1)
MONOCYTES NFR BLD AUTO: 2 % (ref 1.7–9.3)
NEUTROPHILS # BLD AUTO: 7.8 K/UL (ref 1.8–7.7)
NEUTROPHILS NFR BLD AUTO: 89.2 % (ref 42.2–75.2)
PHOSPHATE SERPL-MCNC: 3.2 MG/DL (ref 2.5–4.9)
PLATELET # BLD AUTO: 333 K/UL (ref 140–450)
POTASSIUM SERPL-SCNC: 3.4 MMOL/L (ref 3.5–5.1)
RBC # BLD AUTO: 4.55 MIL/UL (ref 4.2–5.4)
SODIUM SERPL-SCNC: 136 MMOL/L (ref 136–145)
WBC # BLD AUTO: 8.7 K/UL (ref 4.8–10.8)

## 2023-06-13 RX ADMIN — Medication SCH DEV: at 16:42

## 2023-06-13 RX ADMIN — INSULIN LISPRO PRN UNITS: 100 INJECTION, SOLUTION INTRAVENOUS; SUBCUTANEOUS at 20:44

## 2023-06-13 RX ADMIN — SODIUM CHLORIDE PRN MG: 9 INJECTION, SOLUTION INTRAVENOUS at 23:52

## 2023-06-13 RX ADMIN — DOCUSATE SODIUM SCH MG: 100 CAPSULE, LIQUID FILLED ORAL at 20:43

## 2023-06-13 RX ADMIN — WATER SCH MG: 1 INJECTION INTRAMUSCULAR; INTRAVENOUS; SUBCUTANEOUS at 14:12

## 2023-06-13 RX ADMIN — MORPHINE SULFATE PRN MG: 2 INJECTION, SOLUTION INTRAMUSCULAR; INTRAVENOUS at 10:11

## 2023-06-13 RX ADMIN — PANTOPRAZOLE SODIUM SCH MG: 40 INJECTION, POWDER, FOR SOLUTION INTRAVENOUS at 10:12

## 2023-06-13 RX ADMIN — MORPHINE SULFATE PRN MG: 2 INJECTION, SOLUTION INTRAMUSCULAR; INTRAVENOUS at 18:46

## 2023-06-13 RX ADMIN — MORPHINE SULFATE PRN MG: 2 INJECTION, SOLUTION INTRAMUSCULAR; INTRAVENOUS at 05:15

## 2023-06-13 RX ADMIN — Medication SCH DEV: at 12:12

## 2023-06-13 RX ADMIN — MORPHINE SULFATE PRN MG: 2 INJECTION, SOLUTION INTRAMUSCULAR; INTRAVENOUS at 23:31

## 2023-06-13 RX ADMIN — LIDOCAINE SCH EA: 50 PATCH CUTANEOUS at 22:50

## 2023-06-13 RX ADMIN — AZITHROMYCIN DIHYDRATE SCH MG: 250 TABLET, FILM COATED ORAL at 09:10

## 2023-06-13 RX ADMIN — Medication SCH DEV: at 06:29

## 2023-06-13 RX ADMIN — SODIUM CHLORIDE PRN MG: 9 INJECTION, SOLUTION INTRAVENOUS at 18:46

## 2023-06-13 RX ADMIN — DOCUSATE SODIUM SCH MG: 100 CAPSULE, LIQUID FILLED ORAL at 09:11

## 2023-06-13 RX ADMIN — SODIUM CHLORIDE PRN MG: 9 INJECTION, SOLUTION INTRAVENOUS at 06:29

## 2023-06-13 RX ADMIN — SODIUM CHLORIDE PRN MG: 9 INJECTION, SOLUTION INTRAVENOUS at 14:45

## 2023-06-13 RX ADMIN — FUROSEMIDE SCH MG: 10 INJECTION, SOLUTION INTRAMUSCULAR; INTRAVENOUS at 10:11

## 2023-06-13 RX ADMIN — WATER SCH MG: 1 INJECTION INTRAMUSCULAR; INTRAVENOUS; SUBCUTANEOUS at 05:13

## 2023-06-13 RX ADMIN — Medication SCH DEV: at 20:43

## 2023-06-13 RX ADMIN — OXYCODONE AND ACETAMINOPHEN PRN TAB: 5; 325 TABLET ORAL at 11:59

## 2023-06-13 RX ADMIN — INSULIN LISPRO PRN UNITS: 100 INJECTION, SOLUTION INTRAVENOUS; SUBCUTANEOUS at 16:44

## 2023-06-13 RX ADMIN — MORPHINE SULFATE PRN MG: 2 INJECTION, SOLUTION INTRAMUSCULAR; INTRAVENOUS at 01:18

## 2023-06-13 RX ADMIN — MORPHINE SULFATE PRN MG: 2 INJECTION, SOLUTION INTRAMUSCULAR; INTRAVENOUS at 14:15

## 2023-06-13 RX ADMIN — INSULIN LISPRO PRN UNITS: 100 INJECTION, SOLUTION INTRAVENOUS; SUBCUTANEOUS at 06:29

## 2023-06-13 RX ADMIN — INSULIN LISPRO PRN UNITS: 100 INJECTION, SOLUTION INTRAVENOUS; SUBCUTANEOUS at 12:15

## 2023-06-13 RX ADMIN — ACETAMINOPHEN PRN MG: 325 TABLET ORAL at 10:42

## 2023-06-13 RX ADMIN — WATER SCH MG: 1 INJECTION INTRAMUSCULAR; INTRAVENOUS; SUBCUTANEOUS at 20:43

## 2023-06-13 NOTE — NUR
XRAY RESULTS OF HIP SHOWS AVASCULAR NECROSIS MD MADE AWARE, PER MD OKAY FOR PATIENT TO HAVE 
LIDOCAINE PATCH, AND ORTHO DOC PALOMO WILL BE CONSULTED

## 2023-06-13 NOTE — NUR
RECEIVED REPORT FROM NIGHT SHIFT NURSE SHADE FOR CONTINUITY OF CARE. ALERT AND ORIENTED X 4. 
RESP. EVEN AND UNLABORED. ON CONTINUOUS O2 @ 3L/NC. IV SITE INTACT. NOT IN ANY DISTRESS 
NOTED. CALL LIGHT KEPT WITHIN REACH. PT. WILL MONITOR CLOSELY.

## 2023-06-13 NOTE — NUR
:



Donna CRENSHAW, in to see patient at bedside to complete a social service assessment. The 
patient is alert and oriented.  I introduced myself to the patient and explained why I was 
in to see the patient.  The patient was in agreement to speaking to me.  Per patient, she 
resides at [facility] in [place].  The patient states she has resided there for 
the last 10 years and has a caregiver that comes into the home daily to provide care to the 
residents of the house.  The patient states she requires assistance with her ADLs.  The 
patient has a shower chair, wheelchair, and a walker.  She states she uses home O2 set to 
3L, and it is provided by [employer].  She does not have a Power of , but states she has 
familial support through her daughter.  Her PCP id Dr. Blas in [place].  The 
discharge plan is to return to the [facility].  As to transport, the patient states she 
will need to an ambulance arranged for her.  Per patient, from the hospital, she usually 
uses an ambulance to get home.

## 2023-06-14 VITALS — SYSTOLIC BLOOD PRESSURE: 143 MMHG | DIASTOLIC BLOOD PRESSURE: 79 MMHG

## 2023-06-14 VITALS — DIASTOLIC BLOOD PRESSURE: 74 MMHG | SYSTOLIC BLOOD PRESSURE: 152 MMHG

## 2023-06-14 VITALS — DIASTOLIC BLOOD PRESSURE: 60 MMHG | SYSTOLIC BLOOD PRESSURE: 136 MMHG

## 2023-06-14 VITALS — DIASTOLIC BLOOD PRESSURE: 83 MMHG | SYSTOLIC BLOOD PRESSURE: 144 MMHG

## 2023-06-14 VITALS — DIASTOLIC BLOOD PRESSURE: 90 MMHG | SYSTOLIC BLOOD PRESSURE: 149 MMHG

## 2023-06-14 VITALS — SYSTOLIC BLOOD PRESSURE: 140 MMHG | DIASTOLIC BLOOD PRESSURE: 80 MMHG

## 2023-06-14 LAB
ANION GAP SERPL CALCULATED.3IONS-SCNC: 11.1 MMOL/L (ref 8–16)
BASOPHILS # BLD AUTO: 0 K/UL (ref 0–0.22)
BASOPHILS NFR BLD AUTO: 0.2 % (ref 0–2)
BUN SERPL-MCNC: 28 MG/DL (ref 7–18)
CHLORIDE SERPL-SCNC: 99 MMOL/L (ref 98–107)
CO2 SERPL-SCNC: 31.3 MMOL/L (ref 21–32)
CREAT SERPL-MCNC: 0.8 MG/DL (ref 0.6–1.3)
EOSINOPHIL # BLD AUTO: 0 K/UL (ref 0–0.4)
EOSINOPHIL NFR BLD AUTO: 0 % (ref 0–4)
ERYTHROCYTE [DISTWIDTH] IN BLOOD BY AUTOMATED COUNT: 15.7 % (ref 11.6–13.7)
GFR SERPL CREATININE-BSD FRML MDRD: 92 ML/MIN (ref 90–?)
GLUCOSE SERPL-MCNC: 207 MG/DL (ref 74–106)
HCT VFR BLD AUTO: 40.6 % (ref 36–48)
HGB BLD-MCNC: 13.1 G/DL (ref 12–16)
LYMPHOCYTES # BLD AUTO: 1 K/UL (ref 2.5–16.5)
LYMPHOCYTES NFR BLD AUTO: 6.3 % (ref 20.5–51.1)
MAGNESIUM SERPL-MCNC: 2.2 MG/DL (ref 1.8–2.4)
MCH RBC QN AUTO: 27 PG (ref 27–31)
MCHC RBC AUTO-ENTMCNC: 32 G/DL (ref 33–37)
MCV RBC AUTO: 83.7 FL (ref 80–94)
MONOCYTES # BLD AUTO: 0.8 K/UL (ref 0.8–1)
MONOCYTES NFR BLD AUTO: 5.3 % (ref 1.7–9.3)
NEUTROPHILS # BLD AUTO: 13.5 K/UL (ref 1.8–7.7)
NEUTROPHILS NFR BLD AUTO: 88.2 % (ref 42.2–75.2)
PHOSPHATE SERPL-MCNC: 3.4 MG/DL (ref 2.5–4.9)
PLATELET # BLD AUTO: 389 K/UL (ref 140–450)
POTASSIUM SERPL-SCNC: 4.4 MMOL/L (ref 3.5–5.1)
RBC # BLD AUTO: 4.85 MIL/UL (ref 4.2–5.4)
SODIUM SERPL-SCNC: 137 MMOL/L (ref 136–145)
WBC # BLD AUTO: 15.3 K/UL (ref 4.8–10.8)

## 2023-06-14 RX ADMIN — Medication SCH DEV: at 12:05

## 2023-06-14 RX ADMIN — SODIUM CHLORIDE PRN MG: 9 INJECTION, SOLUTION INTRAVENOUS at 13:14

## 2023-06-14 RX ADMIN — PANTOPRAZOLE SODIUM SCH MG: 40 INJECTION, POWDER, FOR SOLUTION INTRAVENOUS at 09:03

## 2023-06-14 RX ADMIN — ACETAMINOPHEN PRN MG: 325 TABLET ORAL at 18:20

## 2023-06-14 RX ADMIN — OXYCODONE AND ACETAMINOPHEN PRN TAB: 5; 325 TABLET ORAL at 15:04

## 2023-06-14 RX ADMIN — DOCUSATE SODIUM SCH MG: 100 CAPSULE, LIQUID FILLED ORAL at 08:23

## 2023-06-14 RX ADMIN — WATER SCH MG: 1 INJECTION INTRAMUSCULAR; INTRAVENOUS; SUBCUTANEOUS at 22:39

## 2023-06-14 RX ADMIN — LIDOCAINE SCH EA: 50 PATCH CUTANEOUS at 12:11

## 2023-06-14 RX ADMIN — WATER SCH MG: 1 INJECTION INTRAMUSCULAR; INTRAVENOUS; SUBCUTANEOUS at 13:06

## 2023-06-14 RX ADMIN — DOCUSATE SODIUM SCH MG: 100 CAPSULE, LIQUID FILLED ORAL at 21:03

## 2023-06-14 RX ADMIN — OXYCODONE AND ACETAMINOPHEN PRN TAB: 5; 325 TABLET ORAL at 08:24

## 2023-06-14 RX ADMIN — MORPHINE SULFATE PRN MG: 2 INJECTION, SOLUTION INTRAMUSCULAR; INTRAVENOUS at 13:06

## 2023-06-14 RX ADMIN — Medication SCH DEV: at 06:43

## 2023-06-14 RX ADMIN — OXYCODONE AND ACETAMINOPHEN PRN TAB: 5; 325 TABLET ORAL at 02:01

## 2023-06-14 RX ADMIN — SODIUM CHLORIDE PRN MG: 9 INJECTION, SOLUTION INTRAVENOUS at 05:14

## 2023-06-14 RX ADMIN — AZITHROMYCIN DIHYDRATE SCH MG: 250 TABLET, FILM COATED ORAL at 08:24

## 2023-06-14 RX ADMIN — INSULIN LISPRO PRN UNITS: 100 INJECTION, SOLUTION INTRAVENOUS; SUBCUTANEOUS at 21:13

## 2023-06-14 RX ADMIN — OXYCODONE AND ACETAMINOPHEN PRN TAB: 5; 325 TABLET ORAL at 21:03

## 2023-06-14 RX ADMIN — Medication SCH DEV: at 16:51

## 2023-06-14 RX ADMIN — MORPHINE SULFATE PRN MG: 2 INJECTION, SOLUTION INTRAMUSCULAR; INTRAVENOUS at 04:14

## 2023-06-14 RX ADMIN — Medication SCH DEV: at 21:12

## 2023-06-14 RX ADMIN — INSULIN LISPRO PRN UNITS: 100 INJECTION, SOLUTION INTRAVENOUS; SUBCUTANEOUS at 06:44

## 2023-06-14 RX ADMIN — WATER SCH MG: 1 INJECTION INTRAMUSCULAR; INTRAVENOUS; SUBCUTANEOUS at 05:12

## 2023-06-14 RX ADMIN — FUROSEMIDE SCH MG: 10 INJECTION, SOLUTION INTRAMUSCULAR; INTRAVENOUS at 09:04

## 2023-06-14 RX ADMIN — INSULIN LISPRO PRN UNITS: 100 INJECTION, SOLUTION INTRAVENOUS; SUBCUTANEOUS at 12:06

## 2023-06-14 RX ADMIN — INSULIN LISPRO PRN UNITS: 100 INJECTION, SOLUTION INTRAVENOUS; SUBCUTANEOUS at 16:52

## 2023-06-14 NOTE — NUR
INFORMED BY PHARMACY REGARDING NEW ORDER OF AZITHROMYCIN IV. PT RECEIVED AZITHROMYCIN PO 
THIS AM. DR. DE GUZMAN NOTIFIED, WITH NEW ORDER TO SWITCH TO IV. NOTED AND CARRIED OUT.

## 2023-06-14 NOTE — NUR
PT COMPLAINTS OF RIGHT HIP AND RIGHT LEG PAIN 6/10. PAIN MEDICATION PERCOCET 5/325 
ADMINISTERED AS ORDER.

## 2023-06-14 NOTE — NUR
2100 SOLU-MEDROL WAS ADMINISTERED TO PATIENT SUCCESSFULLY WITHOUT ANY ISSUES WITH IV. SAILAJA ALLISON WAS INFORMED OF ADMINISTRATION.

## 2023-06-14 NOTE — NUR
PT SATING 96% ON 3LNC. PT STATED THAT SHE USES HOME O2 @ 3L. PT COMPLAINED OF NASAL PASSAGE 
DRYNESS. PUT PT ON BUBBLE HUMIDIFIER. NO DISTRESS NOTED. WILL CONTINUE TO MONITOR PT.

## 2023-06-14 NOTE — NUR
PT COMPLAINTS OF PAIN ON RIGHT HIP AND LEG 9/10. PAIN MEDICATION MORPHINE IS ADMINISTERED AS 
ORDERED.

## 2023-06-14 NOTE — NUR
RECEIVED REPORT FROM NIGHT NURSE EMILY FOR CONTINUITY OF CARE. ALERT AND ORIENTED X 4. RESP. 
EVEN AND UNLABORED. ON CONTINUOUS O2 @ 3L/NC. IV SITE INTACT. ON SALINE LOCK. NO C/O PAIN OR 
DISCOMFORT. CALL LIGHT KEPT WITHIN REACH. PT. WILL MONITOR CLOSELY.

## 2023-06-14 NOTE — NUR
RECD. REPORT FROM BARRERA RUDD. PATIENT RESTING COMFORTABLY SLEEPING BUT EASILY AROUSABLE. 
A/OX4. RESPIRATION EVEN AND UNLABORED. ON 02 AT  LITERS VIA N/C, 02 SAT - 3 LITERS VIA N/C, 
02 SAT - 97%. IV SALINE LOCK AT THE RIGHT FOREARM G22, PATENT AND INTACT. WITH PUREWICK 
CONNECTED TO CANISTER DRAINING MODERATE AMOUNT OF CLEAR YELLOW URINE. DENIES PAIN 0/10.

## 2023-06-14 NOTE — NUR
COMPLAINS OF 8/10 PAIN, MORPHINE PRN GIVEN. COMPLAINS OF NAUSEA, ZOFRAN PRN GIVEN. OTHER 
SCHEDULED MEDICATIONS DUE GIVEN. WILL CONTINUE TO MONITOR.

## 2023-06-15 VITALS — DIASTOLIC BLOOD PRESSURE: 78 MMHG | SYSTOLIC BLOOD PRESSURE: 148 MMHG

## 2023-06-15 VITALS — DIASTOLIC BLOOD PRESSURE: 75 MMHG | SYSTOLIC BLOOD PRESSURE: 146 MMHG

## 2023-06-15 LAB
ANION GAP SERPL CALCULATED.3IONS-SCNC: 12.1 MMOL/L (ref 8–16)
BASOPHILS # BLD AUTO: 0 K/UL (ref 0–0.22)
BASOPHILS NFR BLD AUTO: 0.3 % (ref 0–2)
BUN SERPL-MCNC: 42 MG/DL (ref 7–18)
CHLORIDE SERPL-SCNC: 99 MMOL/L (ref 98–107)
CO2 SERPL-SCNC: 30.3 MMOL/L (ref 21–32)
CREAT SERPL-MCNC: 1 MG/DL (ref 0.6–1.3)
EOSINOPHIL # BLD AUTO: 0 K/UL (ref 0–0.4)
EOSINOPHIL NFR BLD AUTO: 0.1 % (ref 0–4)
ERYTHROCYTE [DISTWIDTH] IN BLOOD BY AUTOMATED COUNT: 15.4 % (ref 11.6–13.7)
GFR SERPL CREATININE-BSD FRML MDRD: 71 ML/MIN (ref 90–?)
GLUCOSE SERPL-MCNC: 224 MG/DL (ref 74–106)
HCT VFR BLD AUTO: 40.8 % (ref 36–48)
HGB BLD-MCNC: 13.2 G/DL (ref 12–16)
LYMPHOCYTES # BLD AUTO: 0.9 K/UL (ref 2.5–16.5)
LYMPHOCYTES NFR BLD AUTO: 7.4 % (ref 20.5–51.1)
MAGNESIUM SERPL-MCNC: 2 MG/DL (ref 1.8–2.4)
MCH RBC QN AUTO: 27 PG (ref 27–31)
MCHC RBC AUTO-ENTMCNC: 32 G/DL (ref 33–37)
MCV RBC AUTO: 83.6 FL (ref 80–94)
MONOCYTES # BLD AUTO: 0.5 K/UL (ref 0.8–1)
MONOCYTES NFR BLD AUTO: 3.9 % (ref 1.7–9.3)
NEUTROPHILS # BLD AUTO: 10.6 K/UL (ref 1.8–7.7)
NEUTROPHILS NFR BLD AUTO: 88.3 % (ref 42.2–75.2)
PHOSPHATE SERPL-MCNC: 3.8 MG/DL (ref 2.5–4.9)
PLATELET # BLD AUTO: 401 K/UL (ref 140–450)
POTASSIUM SERPL-SCNC: 4.4 MMOL/L (ref 3.5–5.1)
RBC # BLD AUTO: 4.88 MIL/UL (ref 4.2–5.4)
SODIUM SERPL-SCNC: 137 MMOL/L (ref 136–145)
WBC # BLD AUTO: 12 K/UL (ref 4.8–10.8)

## 2023-06-15 RX ADMIN — MORPHINE SULFATE PRN MG: 2 INJECTION, SOLUTION INTRAMUSCULAR; INTRAVENOUS at 11:59

## 2023-06-15 RX ADMIN — WATER SCH MG: 1 INJECTION INTRAMUSCULAR; INTRAVENOUS; SUBCUTANEOUS at 13:00

## 2023-06-15 RX ADMIN — OXYCODONE AND ACETAMINOPHEN PRN TAB: 5; 325 TABLET ORAL at 08:17

## 2023-06-15 RX ADMIN — PANTOPRAZOLE SODIUM SCH MG: 40 INJECTION, POWDER, FOR SOLUTION INTRAVENOUS at 10:47

## 2023-06-15 RX ADMIN — INSULIN LISPRO PRN UNITS: 100 INJECTION, SOLUTION INTRAVENOUS; SUBCUTANEOUS at 06:36

## 2023-06-15 RX ADMIN — FUROSEMIDE SCH MG: 10 INJECTION, SOLUTION INTRAMUSCULAR; INTRAVENOUS at 09:00

## 2023-06-15 RX ADMIN — Medication SCH DEV: at 17:23

## 2023-06-15 RX ADMIN — OXYCODONE AND ACETAMINOPHEN PRN TAB: 5; 325 TABLET ORAL at 01:53

## 2023-06-15 RX ADMIN — Medication SCH DEV: at 06:32

## 2023-06-15 RX ADMIN — WATER SCH MG: 1 INJECTION INTRAMUSCULAR; INTRAVENOUS; SUBCUTANEOUS at 06:23

## 2023-06-15 RX ADMIN — INSULIN LISPRO PRN UNITS: 100 INJECTION, SOLUTION INTRAVENOUS; SUBCUTANEOUS at 17:24

## 2023-06-15 RX ADMIN — MORPHINE SULFATE PRN MG: 2 INJECTION, SOLUTION INTRAMUSCULAR; INTRAVENOUS at 17:33

## 2023-06-15 RX ADMIN — OXYCODONE AND ACETAMINOPHEN PRN TAB: 5; 325 TABLET ORAL at 14:34

## 2023-06-15 RX ADMIN — Medication SCH DEV: at 12:19

## 2023-06-15 RX ADMIN — INSULIN LISPRO PRN UNITS: 100 INJECTION, SOLUTION INTRAVENOUS; SUBCUTANEOUS at 12:21

## 2023-06-15 RX ADMIN — DOCUSATE SODIUM SCH MG: 100 CAPSULE, LIQUID FILLED ORAL at 08:19

## 2023-06-15 NOTE — NUR
***** PHYSICAL THERAPY CO-SIGN *****

The Physical Therapy Progress Notes documented by Physical Therapy Assistant have been 
reviewed.



Reviewed/Co-Signed by: Bebe Camarillo PT

Documentation Done by:NIGHAT LOPEZ PTA

-------------------------------------------------------------------------------

Addendum: 06/15/23 at 1607 by Bebe Camarillo PT

-------------------------------------------------------------------------------

Amended: Links added.

## 2023-06-15 NOTE — NUR
CALLED HANNAH FROM [facility] ROOM AND BOARD. INFORMING REGARDING PT WILL BE DISCHARGE AND 
TRANSPORTATION WILL BE SET UP. AS PER HANNAH THEY DON'T PROVIDE TRANSPORTATION WITH OXYGEN. 
CHARGE NURSE RAUDEL MADE AWARE, VENICE MADE AWARE.

## 2023-06-15 NOTE — NUR
0500 SOLU-MEDROL WAS ADMINISTERED TO PATIENT SUCCESSFULLY WITHOUT ANY ISSUES WITH IV. SAILAJA ALLISON WAS INFORMED OF ADMINISTRATION.

## 2023-06-15 NOTE — NUR
PT LEFT. DISCHARGE BACK TO  & [facility] FACILITY. TRANSPORTED BY [facility] PER 
LUISA. ALERT AND ORIENTED X 4. RESP. EVEN AND UNLABORED. DISCHARGED PAPERWORK DISCUSS AND 
SIGNED BY PT. ID BAND AND IV REMOVED. NOT IN ANY DISTRESS NOTED. REMAINS STABLE.

## 2023-06-15 NOTE — NUR
RECEIVED ORDER FOR PATIENT TO GET HOME HEALTH FOR PT. FAXED ALL PAPERWORK TO [facility]. WILL FOLLOW UP IF ACCEPTING.



RECEIVED WORD PATIENT NEEDS TRANSFORATION HOME. TRANSPORTATION ARRANGED WITH [facility] 
[facility] FOR A GURNEY TRANSPORT [phone] WITH 3L OF O2 NC  TIME IS [phone]. 
RESERVATION #[id number]





CALLED DR SRINIVASAN'S OFFICE [phone] LOCATED  [address] [place] [address]. SPOKE WITH JOSE WHO WAS ABLE TO HELP ME SCHEDULE A FOLLOW UP APPOINTMENT FOR 6/19/23 
AT 0830 WENT TO PATIENT ROOM TO INFORM HER AND GAVE AN APPOINTMENT SLIP.

-------------------------------------------------------------------------------

Addendum: 06/15/23 at 1502 by ERMA MCKEON CM

-------------------------------------------------------------------------------

PATIENT WAS RECEIVING CARE FROM [facility] PRIOR TO BEING ADMITTED PER PATIENT SHE 
WOULD LIKE TO SAY WITH THE SAME HOME HEALTH SO [facility] [phone] WAS FAXED 
NEW CLINICALS AND INFORMED PATIENT WILL BE DISCHARGED TODAY. CALLED HANNAH [phone] 
CARE GIVER AT [facility] WHO IS AWARE OF THE ABOVE INFORMATION.

-------------------------------------------------------------------------------

Addendum: 06/15/23 at 1507 by ERMA MCKEON CM

-------------------------------------------------------------------------------

[facility] [phone] WILL BE PICKING UP PATIENT AT 1830

## 2023-06-15 NOTE — NUR
RECEIVED REPORT FROM NIGHT NURSE ESTEFANY FOR CONTINUITY OF CARE. ALERT AND ORIENTED X 4. RESP. 
EVEN AND UNLABORED. ON CONTINUOUS O2 @ 3L/NC. IV SITE INTACT. ON SALINE LOCK. NO C/O PAIN OR 
DISCOMFORT. CALL LIGHT KEPT WITHIN REACH. PT. WILL MONITOR CLOSELY.

## 2023-06-15 NOTE — NUR
BS CHECKED 215. INSULIN WAS GIVEN PER SLIDING SCALE.

-------------------------------------------------------------------------------

Addendum: 06/15/23 at 1735 by VAMSHI FRAZIER LVN

-------------------------------------------------------------------------------

GLUCOPHAGE PO WAS GIVEN. TOLERATED WELL.

## 2023-06-15 NOTE — NUR
C/O OF FEELING HOT, SWEATY. TEMPERATURE CHECKED - 97.9 F. AFEBRILE. CHANGED GOWN. MADE 
COMFORTABLE IN BED WITH PILLOWS.